# Patient Record
Sex: FEMALE
[De-identification: names, ages, dates, MRNs, and addresses within clinical notes are randomized per-mention and may not be internally consistent; named-entity substitution may affect disease eponyms.]

---

## 2019-08-05 ENCOUNTER — HOSPITAL ENCOUNTER (OUTPATIENT)
Dept: HOSPITAL 93 - SONOGRAMA | Age: 49
Discharge: HOME | End: 2019-08-05
Attending: PATHOLOGY
Payer: COMMERCIAL

## 2019-08-05 DIAGNOSIS — E04.0: Primary | ICD-10-CM

## 2020-01-17 ENCOUNTER — HOSPITAL ENCOUNTER (INPATIENT)
Dept: HOSPITAL 93 - ER | Age: 50
LOS: 6 days | Discharge: HOME | DRG: 392 | End: 2020-01-23
Attending: INTERNAL MEDICINE | Admitting: INTERNAL MEDICINE
Payer: COMMERCIAL

## 2020-01-17 VITALS — WEIGHT: 135 LBS | HEIGHT: 65 IN | BODY MASS INDEX: 22.49 KG/M2

## 2020-01-17 DIAGNOSIS — K57.32: Primary | ICD-10-CM

## 2020-01-17 DIAGNOSIS — K52.89: ICD-10-CM

## 2020-01-17 DIAGNOSIS — E03.8: ICD-10-CM

## 2020-01-17 DIAGNOSIS — I10: ICD-10-CM

## 2020-01-17 DIAGNOSIS — R10.32: ICD-10-CM

## 2020-01-17 PROCEDURE — BW21Y0Z COMPUTERIZED TOMOGRAPHY (CT SCAN) OF ABDOMEN AND PELVIS USING OTHER CONTRAST, UNENHANCED AND ENHANCED: ICD-10-PCS | Performed by: INTERNAL MEDICINE

## 2020-01-17 NOTE — NUR
PACIENTE ALERTA Y ORIENTADA X3. MANEJADA POR MISS. [place] QUIEN ORIENTA A
PACIENTE SOBRE TX Y PROCEDIMIENTO A REALIZAR Y REFIERE ENTENDER. REALIZA
MUESTRAS DE LABORATORIO BAJO MEDIDAS ASEPTICAS. CANALIZACION PATENTE Y MYRA DE
EDEMA Y ERITEMA. ADMINISTRA MEDICAMENO VINOD ORDEN MEDICA. SE MANTIENE BAJO
OBSERVACION POR CAMBIOS SIGNIFICATIVOS.

## 2020-01-17 NOTE — NUR
SE RECIBE PTE ALERTA Y ORIENTADA X3 EN SHAN CON BARANDAS ELEVADAS. PTE SE
OBSERVA CON IV FLUIDS COLOCADOS, PTE AL MOMENTO NO REFIERE DOLOR. PTE EN ESPERA
DE RE EVALUACION MEDICA. PTE SE CONTINUA MONITORIANDO POR CAMBIOS.

## 2020-05-15 ENCOUNTER — HOSPITAL ENCOUNTER (OUTPATIENT)
Dept: HOSPITAL 93 - TOM | Age: 50
Discharge: HOME | End: 2020-05-15
Attending: INTERNAL MEDICINE
Payer: COMMERCIAL

## 2020-05-15 DIAGNOSIS — K57.33: Primary | ICD-10-CM

## 2020-06-12 ENCOUNTER — HOSPITAL ENCOUNTER (OUTPATIENT)
Dept: HOSPITAL 93 - SONOGRAMA | Age: 50
Discharge: HOME | End: 2020-06-12
Attending: INTERNAL MEDICINE
Payer: COMMERCIAL

## 2020-06-12 DIAGNOSIS — R11.2: ICD-10-CM

## 2020-06-12 DIAGNOSIS — R10.84: Primary | ICD-10-CM

## 2020-07-06 ENCOUNTER — HOSPITAL ENCOUNTER (OUTPATIENT)
Dept: HOSPITAL 93 - AMB-ENDOS | Age: 50
Discharge: HOME | End: 2020-07-06
Attending: COLON & RECTAL SURGERY
Payer: COMMERCIAL

## 2020-07-06 DIAGNOSIS — K57.32: Primary | ICD-10-CM

## 2020-07-24 ENCOUNTER — HOSPITAL ENCOUNTER (OUTPATIENT)
Dept: HOSPITAL 93 - NUCLEAR | Age: 50
Discharge: HOME | End: 2020-07-24
Attending: OBSTETRICS & GYNECOLOGY
Payer: COMMERCIAL

## 2020-07-24 DIAGNOSIS — I82.402: Primary | ICD-10-CM

## 2020-08-24 ENCOUNTER — HOSPITAL ENCOUNTER (OUTPATIENT)
Dept: HOSPITAL 93 - LAB | Age: 50
Discharge: HOME | End: 2020-08-24
Attending: INTERNAL MEDICINE
Payer: COMMERCIAL

## 2020-08-24 DIAGNOSIS — E78.49: ICD-10-CM

## 2020-08-24 DIAGNOSIS — E03.8: Primary | ICD-10-CM

## 2020-08-24 DIAGNOSIS — R05: ICD-10-CM

## 2020-08-24 DIAGNOSIS — Z20.828: ICD-10-CM

## 2021-04-14 ENCOUNTER — HOSPITAL ENCOUNTER (EMERGENCY)
Dept: HOSPITAL 93 - ER | Age: 51
Discharge: HOME | End: 2021-04-14
Payer: COMMERCIAL

## 2021-04-14 VITALS — HEIGHT: 65 IN | WEIGHT: 144 LBS | BODY MASS INDEX: 23.99 KG/M2

## 2021-04-14 DIAGNOSIS — K57.90: Primary | ICD-10-CM

## 2021-04-14 DIAGNOSIS — R10.32: ICD-10-CM

## 2021-08-12 ENCOUNTER — HOSPITAL ENCOUNTER (OUTPATIENT)
Dept: HOSPITAL 93 - RAD | Age: 51
Discharge: HOME | End: 2021-08-12
Attending: SPECIALIST
Payer: COMMERCIAL

## 2021-08-12 DIAGNOSIS — M54.5: Primary | ICD-10-CM

## 2021-12-29 ENCOUNTER — HOSPITAL ENCOUNTER (EMERGENCY)
Dept: HOSPITAL 93 - ER | Age: 51
Discharge: HOME | End: 2021-12-29
Payer: COMMERCIAL

## 2021-12-29 VITALS — HEIGHT: 65 IN | WEIGHT: 148 LBS | BODY MASS INDEX: 24.66 KG/M2

## 2021-12-29 DIAGNOSIS — J02.8: Primary | ICD-10-CM

## 2021-12-29 DIAGNOSIS — B96.0: ICD-10-CM

## 2021-12-29 DIAGNOSIS — B97.89: ICD-10-CM

## 2022-05-09 ENCOUNTER — HOSPITAL ENCOUNTER (EMERGENCY)
Dept: HOSPITAL 93 - ER | Age: 52
Discharge: HOME | End: 2022-05-09
Payer: COMMERCIAL

## 2022-05-09 VITALS — BODY MASS INDEX: 24.66 KG/M2 | HEIGHT: 65 IN | WEIGHT: 148 LBS

## 2022-05-09 DIAGNOSIS — Z20.822: ICD-10-CM

## 2022-05-09 DIAGNOSIS — I10: ICD-10-CM

## 2022-05-09 DIAGNOSIS — M54.2: ICD-10-CM

## 2022-05-09 DIAGNOSIS — N76.0: Primary | ICD-10-CM

## 2022-06-20 ENCOUNTER — HOSPITAL ENCOUNTER (OUTPATIENT)
Dept: HOSPITAL 93 - RAD | Age: 52
Discharge: HOME | End: 2022-06-20
Attending: ORTHOPAEDIC SURGERY
Payer: COMMERCIAL

## 2022-06-20 DIAGNOSIS — M25.562: ICD-10-CM

## 2022-06-20 DIAGNOSIS — M25.561: Primary | ICD-10-CM

## 2023-06-30 ENCOUNTER — HOSPITAL ENCOUNTER (OUTPATIENT)
Dept: HOSPITAL 93 - SONOGRAMA | Age: 53
Discharge: HOME | End: 2023-06-30
Attending: PODIATRIST
Payer: COMMERCIAL

## 2023-06-30 DIAGNOSIS — Q66.89: Primary | ICD-10-CM

## 2023-06-30 DIAGNOSIS — M72.2: ICD-10-CM

## 2024-06-25 ENCOUNTER — HOSPITAL ENCOUNTER (OUTPATIENT)
Dept: HOSPITAL 93 - MAMO-SONO | Age: 54
Discharge: HOME | End: 2024-06-25
Attending: INTERNAL MEDICINE
Payer: COMMERCIAL

## 2024-06-25 DIAGNOSIS — N60.01: ICD-10-CM

## 2024-06-25 DIAGNOSIS — N60.02: ICD-10-CM

## 2024-06-25 DIAGNOSIS — M54.50: Primary | ICD-10-CM

## 2024-07-24 ENCOUNTER — HOSPITAL ENCOUNTER (OUTPATIENT)
Dept: HOSPITAL 93 - RAD | Age: 54
Discharge: HOME | End: 2024-07-24
Attending: PHYSICAL MEDICINE & REHABILITATION
Payer: COMMERCIAL

## 2024-07-24 DIAGNOSIS — M54.2: Primary | ICD-10-CM
